# Patient Record
(demographics unavailable — no encounter records)

---

## 2024-12-17 NOTE — CARDIOLOGY SUMMARY
[de-identified] : December 17, 2024 Sinus rhythm no acute abnormalities [de-identified] : May 2023 stent to lad 50% circ closed rca with collaterals

## 2024-12-17 NOTE — REASON FOR VISIT
[Hyperlipidemia] : hyperlipidemia [Hypertension] : hypertension [Coronary Artery Disease] : coronary artery disease [FreeTextEntry1] : Patient returns for followup. Feeling well. Offers no complaints of chest discomfort shortness of breath palpitations dizziness or syncope.

## 2024-12-17 NOTE — ASSESSMENT
[FreeTextEntry1] : Impression: 1.  Multivessel coronary disease status post stenting to the LAD.  Has chronic total occlusion of right coronary artery and nonobstructive disease in the circumflex 2.  Dyslipidemia at goal at last check in August  Plan: 1.  Continue current medical regimen including dual antiplatelet therapy given multivessel disease.  At this time patient is not experiencing any side effects from aspirin or clopidogrel

## 2024-12-27 NOTE — ASSESSMENT
[FreeTextEntry1] : -Hypertension. Seemingly well controlled on present regimen losartan HCTZ metoprolol. Urine protein screen bmp.  -Hyperlipidemia. HDL 32 LDL 80 on lipid-lowering therapy (fenofibrate, atorvastatin) with normal LFTs 2024. Recheck LFTs every 6 months given dual lipid-lowering therapy next February 2024.  -Prior history nicotine dependence. Quit 2000 and, lifetime burden 50-pack-year. Aware of connection to coronary artery disease risk of lung cancer and stroke. Performed what will likely be last lung CT this summer, which showed no evidence of malignancy, patient 23 years out from last cigarette.  -Asymptomatic coronary artery disease status post mid LAD ELIJAH, 2023. Post cath echo normal. Beta-blocker statin ARB DAPT.  -GERD. No red flags to merit EGD.  Symptoms well-controlled.  Update Vit D B12 Mg.  -JESSICA on CPAP.  Regularly sees sleep medicine.  -50-pack-year smoker, quit 2008.  Had short-term follow-up chest CT for groundglass infiltrate which fortunately regressed but did not resolve.  Repeat fall 2025, quit over 15 years ago.  -Iron deficiency anemia, uncertain etiology. Hemoglobin 2021, 14.9, 12.3 April 2022 5% iron saturation. Summer 2023 hemoglobin 11.9, 6% iron saturation low normal B12 346, celiac disease ruled out by TTG and HLA testing. Noteworthy only mild intraepithelial lymphocytosis noted on duodenal bulb biopsy, now on DAPT needs closer surveillance. GI Dr. Brooks following.Screen for intrinsic factor antibodies, admittedly lower sensitivity, returned negative.  -Morbid obesity, medically complicated class III.  Coronary disease hypertension hyperlipidemia GERD.  No personal or family history Of M EN type II thyroid cancer including medullary thyroid cancer or pancreatitis.  Will explore tirzepatide.  Reviewed side effects management strategy time course of benefit need to consider an game strategy.  Zepbound 2.5 mg subcu weekly autoinjector prescription sent.  -Routine adult health maintenance. PSA TSH A1c hepatitis B hepatitis C HIV screening all returned normal/negative, 2023, update. Given CAD, abdominal aortic ultrasound age 65. EGD colonoscopy without polyps, 2022. Repeat colonoscopy 2027, possibly sooner based on tempo of iron deficiency anemia. 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 Prevnar 20 at this time. Due age 65. Shingrix: 2020 COVID: Last COVID booster September 2024. Influenza: Up-to-date with flu this season.  It was a pleasure to visit with Mr. Mahmood today. Answered all questions as best I could. Disposition: Follow-up 6 months, blood and urine test at that time including CBC iron studies urine protein LFTs. Time 45 minutes

## 2024-12-27 NOTE — HEALTH RISK ASSESSMENT
[Excellent] : ~his/her~  mood as  excellent [Yes] : Yes [Monthly or less (1 pt)] : Monthly or less (1 point) [1 or 2 (0 pts)] : 1 or 2 (0 points) [No] : In the past 12 months have you used drugs other than those required for medical reasons? No [No falls in past year] : Patient reported no falls in the past year [0] : 2) Feeling down, depressed, or hopeless: Not at all (0) [Audit-CScore] : 1 [de-identified] : Somewhat sedentary [de-identified] : Fair [facility]] : 9 [[id number]] : 0 [Former] : Former [20 or more] : 20 or more [> 15 Years] : > 15 Years [de-identified] : Quit 2008 [NO] : No [Patient reported colonoscopy was normal] : Patient reported colonoscopy was normal [HIV test declined] : HIV test declined [Hepatitis C test declined] : Hepatitis C test declined [Change in mental status noted] : No change in mental status noted [Language] : denies difficulty with language [Behavior] : denies difficulty with behavior [Learning/Retaining New Information] : denies difficulty learning/retaining new information [Handling Complex Tasks] : denies difficulty handling complex tasks [Reasoning] : denies difficulty with reasoning [Spatial Ability and Orientation] : denies difficulty with spatial ability and orientation [None] : None [With Family] : lives with family [Employed] : employed [High School] : high school [] :  [Sexually Active] : sexually active [High Risk Behavior] : no high risk behavior [Feels Safe at Home] : Feels safe at home [Fully functional (bathing, dressing, toileting, transferring, walking, feeding)] : Fully functional (bathing, dressing, toileting, transferring, walking, feeding) [Fully functional (using the telephone, shopping, preparing meals, housekeeping, doing laundry, using] : Fully functional and needs no help or supervision to perform IADLs (using the telephone, shopping, preparing meals, housekeeping, doing laundry, using transportation, managing medications and managing finances) [Reports changes in hearing] : Reports no changes in hearing [Reports changes in vision] : Reports no changes in vision [Reports normal functional visual acuity (ie: able to read med bottle)] : Reports normal functional visual acuity [Reports changes in dental health] : Reports no changes in dental health [Smoke Detector] : smoke detector [Carbon Monoxide Detector] : carbon monoxide detector [Safety elements used in home] : safety elements used in home [Seat Belt] :  uses seat belt [Sunscreen] : does not use sunscreen [Travel to Developing Areas] : does not  travel to developing areas [TB Exposure] : is not being exposed to tuberculosis [Caregiver Concerns] : does not have caregiver concerns [HIVDate] : 7/23 [ColonoscopyDate] : 06/22 [HepatitisCDate] : 7/23

## 2024-12-27 NOTE — PHYSICAL EXAM
[No CVA Tenderness] : no CVA  tenderness [No Spinal Tenderness] : no spinal tenderness [Kyphosis] : no kyphosis [Scoliosis] : no scoliosis [Acne] : no acne [Normal] : normal gait, coordination grossly intact, no focal deficits and deep tendon reflexes were 2+ and symmetric [de-identified] : mild large and small joint OA changes without active synovitis [de-identified] : rosacea [de-identified] : Mild lateral thigh num

## 2024-12-27 NOTE — HISTORY OF PRESENT ILLNESS
[FreeTextEntry1] : CPE [de-identified] : 60-year-old white male with history of hypertension hyperlipidemia remote nicotine dependence complicated by asymptomatic coronary artery disease status post mid LAD ELIJAH in 2023 now on DAPT statin intensification, post cath echo normal, who attends for CPE.  Since April 2022 he has had iron deficiency anemia of uncertain etiology. Extensive work-up including EGD colonoscopy capsule enteroscopy Hemoccults have not identified a bleed source. Biopsy and screen for celiac disease similarly negative.   Not currently taking iron, last iron infusion over a year ago.  Has not seen any blood in the urine or stool.  Has gained 18 pounds over the past year, with increasing low back pain.  Medical complications of coronary disease hypertension hyperlipidemia obstructive sleep apnea on CPAP and prediabetes A1c last year 6.0%.

## 2025-01-27 NOTE — ASSESSMENT
[FreeTextEntry1] : -LICHA. Early presentation. RVP, tamiflu, z pack. Spoke of sudafed if cant equalize.  -Hypertension. Seemingly well controlled on present regimen losartan HCTZ metoprolol. Urine protein screen bmp.  -Hyperlipidemia. HDL 32 LDL 80 on lipid-lowering therapy (fenofibrate, 20mg atorvastatin) with normal LFTs 2024. Recheck LFTs every 6 months given dual lipid-lowering therapy next February 2024.  -Prior history nicotine dependence. Quit 2000 and, lifetime burden 50-pack-year. Aware of connection to coronary artery disease risk of lung cancer and stroke. Performed what will likely be last lung CT this summer, which showed no evidence of malignancy, patient 23 years out from last cigarette.  -Asymptomatic coronary artery disease status post mid LAD ELIJAH, 2023. Post cath echo normal. Beta-blocker statin ARB DAPT.  -GERD. No red flags to merit EGD. Symptoms well-controlled. Update Vit D B12 Mg.  -JESSICA on CPAP. Regularly sees sleep medicine.  -50-pack-year smoker, quit 2008. Had short-term follow-up chest CT for groundglass infiltrate which fortunately regressed but did not resolve. Repeat fall 2025, quit over 15 years ago.  -Iron deficiency anemia, uncertain etiology, resolved. Hemoglobin 2021, 14.9, 12.3 April 2022 5% iron saturation. Summer 2023 hemoglobin 11.9, 6% iron saturation low normal B12 346, celiac disease ruled out by TTG and HLA testing. Noteworthy only mild intraepithelial lymphocytosis noted on duodenal bulb biopsy, now on DAPT needs closer surveillance. GI Dr. Brooks following.Screen for intrinsic factor antibodies, admittedly lower sensitivity, returned negative.  *Prediabetes. Ac up to 6.4.  Starting zepbound.  *Statin myopathy. 229 peak 458, improved w statin dose reduction. RADHIKA MORELIA HMG ab on rtn.  -Morbid obesity, medically complicated class III. Coronary disease hypertension hyperlipidemia GERD. No personal or family history Of M EN type II thyroid cancer including medullary thyroid cancer or pancreatitis. Will explore tirzepatide. Reviewed side effects management strategy time course of benefit need to consider an game strategy. Zepbound 2.5 mg subcu weekly autoinjector prescription sent.  -Routine adult health maintenance. PSA TSH A1c hepatitis B hepatitis C HIV screening all returned normal/negative, 2023, update. Given CAD, abdominal aortic ultrasound age 65. EGD colonoscopy without polyps, 2022. Repeat colonoscopy 2027, possibly sooner based on tempo of iron deficiency anemia. 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 Prevnar 20 at this time. Due age 65. Shingrix: 2020 COVID: Last COVID booster September 2024. Influenza: Up-to-date with flu this season.  It was a pleasure to visit with Mr. Mahmood today. Answered all questions as best I could. Disposition: Follow-up 6 months, A1c CK ALT CBC ferritin. Time

## 2025-01-27 NOTE — HISTORY OF PRESENT ILLNESS
[FreeTextEntry1] : cough rpt visit [de-identified] : 60-year-old white male with history of hypertension hyperlipidemia remote nicotine dependence complicated by asymptomatic coronary artery disease status post mid LAD ELIJAH in 2023 now on DAPT statin intensification, post cath echo normal, who attends for cough and to review recent labs significant for fluctating CK since 2024, rising A1c to 6.4%, 12# weight gain over last 6 months rx tirzepatide last month,   Since April 2022 he has had iron deficiency anemia of uncertain etiology. Extensive work-up including EGD colonoscopy capsule enteroscopy Hemoccults have not identified a bleed source. Biopsy and screen for celiac disease similarly negative.   Not currently taking iron, last iron infusion over a year ago.  Has not seen any blood in the urine or stool. Iron levels and anemia normal, 1/2025.  Regarding cough, pt reports Half day history of nasal congestion scratchy throat clear rhinorrhea without fever muscle aches, though feels he may be developing such now.  Wife recently ill, seen last week 10 days into illness.  Going on vacation end of this week.

## 2025-01-27 NOTE — PHYSICAL EXAM
[Normal TMs] : both tympanic membranes were normal [No Lymphadenopathy] : no lymphadenopathy [Normal] : affect was normal and insight and judgment were intact [de-identified] : Mildly ill appearing [de-identified] : mod coryza with sinus tender

## 2025-03-19 NOTE — ASSESSMENT
[FreeTextEntry1] : 61y/o  male  1- ct 9/2024  decreased  RLL posterior  segment new  1.4 cm opacity ? aspiration    ex  smoker  > 30 pack year 2- JESSICA  severe   BMI  41.23 3- CAD  stent 4- h/o lyme dz 5- GERD   at times dysphagia  6- fire department PFT  7-  vaccinations  per primary    Recommendations 1-    Mr Flores is using his  cpap machine and feels  more alert with this and is benefiting from this/   F40  mask       2- sleep MD f/u  3- f/u  ct      one year 4-     zepbound  good results   5- to get copy of PFT  from firedepartment  discussion reviewed compliance sleep and   mask -  download data   reviewed  prevention     -

## 2025-03-19 NOTE — REVIEW OF SYSTEMS
[Sinus Problems] : sinus problems [Seasonal Allergies] : seasonal allergies [GERD] : gerd [Chronic Pain] : chronic pain [Anemia] : anemia [Obesity] : obesity [Recent Wt Loss (___ Lbs)] : ~T recent [unfilled] lb weight loss [Fever] : no fever [Recent Wt Gain (___ Lbs)] : ~T no recent weight gain [Chills] : no chills [Dry Eyes] : no dry eyes [Eye Irritation] : no eye irritation [Nasal Congestion] : no nasal congestion [Postnasal Drip] : no postnasal drip [Cough] : no cough [Hemoptysis] : no hemoptysis [Chest Tightness] : no chest tightness [Sputum] : no sputum [Dyspnea] : no dyspnea [Wheezing] : no wheezing [Chest Discomfort] : no chest discomfort [Palpitations] : no palpitations [Abdominal Pain] : no abdominal pain [Nausea] : no nausea [Vomiting] : no vomiting [Dysphagia] : no dysphagia [Bleeding] : no bleeding [Blood Transfusion] : no blood transfusion [Clotting Disorder/ Frequent bleeding] : no clotting disorder/ frequent bleeding [Headache] : no headache [Focal Weakness] : no focal weakness [Numbness] : no numbness [Diabetes] : no diabetes [Thyroid Problem] : no thyroid problem [TextBox_3] : zepbound   13 pound   weight loss  [TextBox_69] : prevacid

## 2025-03-19 NOTE — PHYSICAL EXAM
[No Acute Distress] : no acute distress [No Deformities] : no deformities [Well Developed] : well developed [IV] : Mallampati Class: IV [Normal Appearance] : normal appearance [Supple] : supple [No Neck Mass] : no neck mass [No JVD] : no jvd [Normal Rate/Rhythm] : normal rate/rhythm [Normal S1, S2] : normal s1, s2 [No Murmurs] : no murmurs [No Resp Distress] : no resp distress [No Acc Muscle Use] : no acc muscle use [Clear to Auscultation Bilaterally] : clear to auscultation bilaterally [Benign] : benign [Not Tender] : not tender [No Masses] : no masses [Soft] : soft [No Hernias] : no hernias [Normal Bowel Sounds] : normal bowel sounds [Normal Gait] : normal gait [No Clubbing] : no clubbing [No Edema] : no edema [No Rash] : no rash [No Motor Deficits] : no motor deficits [Normal Affect] : normal affect [TextBox_2] : pleasant male no distress no cough no sob  [TextBox_11] : crowded no lesion moist

## 2025-03-19 NOTE — HISTORY OF PRESENT ILLNESS
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 aspiration  larger area  admits to GERD  - at times dysphagia   however feels not a concern  -no wheezing no cough  -   9/25/2024 59y/o  male ex   smoker  > 20    JESSICA  on  bipap   tolerant    abn ct  - f/u ct   stable ground glass -     decreased - doing well  reviewed sleep compliance  some air leak noted  -  3/19/2025 59y/o  male ex smoker      JESSICA on  bipap  F$0  now  prefers  abn ct - using his cpap feels refreshed - now on zepbound doing well      one month - [TextBox_11] : 1.5  [YearQuit] : 2018  [TextBox_77] : 12 [TextBox_79] : 7 [TextBox_135] : 16/12 [TextBox_127] : 2/25 [TextBox_129] : 3/25 [TextBox_133] : 30/30 day [TextBox_137] : 30 days  100 %  [TextBox_141] : 8 [TextBox_143] : 19 [TextBox_147] : 0.8 [TextBox_158] : hamptoms [TextBox_165] : -   now   f  40 mask  doing well

## 2025-04-07 NOTE — REASON FOR VISIT
[Hyperlipidemia] : hyperlipidemia [Hypertension] : hypertension [Coronary Artery Disease] : coronary artery disease [FreeTextEntry1] : Patient returns for followup. Feeling well. Offers no complaints of chest discomfort shortness of breath palpitations dizziness or syncope.  He is tolerating lower dose atorvastatin along with Zetia.  He has lost 12 pounds on low-dose Zepbound

## 2025-04-07 NOTE — ASSESSMENT
[FreeTextEntry1] : Impression: 1.  Multivessel coronary disease status post stenting to the LAD in May 2023 has chronic total occlusion of right coronary artery and nonobstructive disease in the circumflex 2.  Dyslipidemia at goal at last check in August  Plan: 1.  Continue current medical regimen including dual antiplatelet therapy given multivessel disease.  At this time patient is not experiencing any side effects from aspirin or clopidogrel

## 2025-04-07 NOTE — CARDIOLOGY SUMMARY
[de-identified] : April 7, 2025 Sinus rhythm baseline artifact no acute abnormalities s [de-identified] : May 2023 stent to lad 50% circ closed rca with collaterals